# Patient Record
Sex: FEMALE | Race: WHITE | NOT HISPANIC OR LATINO | Employment: FULL TIME | URBAN - METROPOLITAN AREA
[De-identification: names, ages, dates, MRNs, and addresses within clinical notes are randomized per-mention and may not be internally consistent; named-entity substitution may affect disease eponyms.]

---

## 2017-06-27 ENCOUNTER — ALLSCRIPTS OFFICE VISIT (OUTPATIENT)
Dept: OTHER | Facility: OTHER | Age: 37
End: 2017-06-27

## 2018-01-11 NOTE — PROGRESS NOTES
Assessment    1  Health maintenance examination (V70 0) (Z00 00)   2  Skin lesion (709 9) (L98 9)    Plan   Tubersol 5 UNIT/0 1ML Intradermal Solution; INJECT 0 1  ML Intradermal; Dose: 0 1ML; Route: Intradermal; Site: Left Forearm; Done: [id number] 02:14PM; Status: Complete - Retrospective Authorization;  Ordered  For: PPD screening test; Ordered By:Nay Lees; Effective Date:27Jun2017; Administered by: Candelaria Bellamy: 6/27/2017 2:14:00 PM; Last Updated By: Candelaria Bellamy; 6/27/2017 2:38:31 PM     Discussion/Summary  health maintenance visit healthy adult female Currently, she eats a healthy diet and has an adequate exercise regimen  cervical cancer screening is current Breast cancer screening: breast cancer screening is not indicated  Colorectal cancer screening: colorectal cancer screening is not indicated  Osteoporosis screening: bone mineral density testing is not indicated  The immunizations are needed and patient declines immunizations  She was advised to be evaluated by dermatology  Advice and education were given regarding nutrition, aerobic exercise, tobacco cessation, sunscreen use and self skin examination  PE form filled out and returned to pt  PPD placed and pt will have read at work  Lower leg lesion appears as dermatofibroma  Scalp lesion more suspicious  Referred to derm for evaluation of both  Pt plans on going to [facility] in [facility] [address]  Chief Complaint  Pt is here for PE and PPD  History of Present Illness  HM, Adult Female: The patient is being seen for a health maintenance evaluation  Social History: Household members include spouse and 2 son(s)  She is   Work status: working full time and occupation:   General Health: The patient's health since the last visit is described as good  She has regular dental visits  She complains of vision problems   Vision care includes wearing soft contact lenses and an eye examination within the last year  She denies hearing loss  Immunizations status: not up to date  Lifestyle:  She consumes a diverse and healthy diet  She does not have any weight concerns  She exercises regularly  She uses tobacco  The patient is a current cigarette smoker  She consumes alcohol  She reports occasional alcohol use  She denies drug use  Reproductive health: the patient is premenopausal    Screening: Cervical cancer screening includes a pap smear performed 2016  Breast cancer screening includes no previous mammogram  She hasn't been previously screened for colorectal cancer  Cardiovascular risk factors: tobacco use  Risk findings: anxiety symptoms, but no depression symptoms, no travel to developing areas and no tuberculosis exposure  HPI: Has mole on top of head that seems to be changing  Will scab and scab will fall off  Also with mole on left lower leg that has not changed  Has had moles removed in past and have been benign  Review of Systems    Constitutional: no fever, not feeling poorly, no chills and not feeling tired  Eyes: No complaints of eye pain, no red eyes, no eyesight problems, no discharge, no dry eyes, no itching of eyes  ENT: no complaints of earache, no loss of hearing, no nose bleeds, no nasal discharge, no sore throat, no hoarseness  Cardiovascular: no chest pain, no palpitations and no lower extremity edema  Respiratory: no shortness of breath, no cough and no wheezing  Gastrointestinal: No complaints of abdominal pain, no constipation, no nausea or vomiting, no diarrhea, no bloody stools  Genitourinary: No complaints of dysuria, no incontinence, no pelvic pain, no dysmenorrhea, no vaginal discharge or bleeding  Musculoskeletal: No complaints of arthralgias, no myalgias, no joint swelling or stiffness, no limb pain or swelling  Integumentary: as noted in HPI  Neurological: no headache, no numbness, no tingling, no dizziness, no limb weakness and no fainting  Psychiatric: anxiety, but no depression  Hematologic/Lymphatic: No complaints of swollen glands, no swollen glands in the neck, does not bleed easily, does not bruise easily  Active Problems    1  Flu vaccine need (V04 81) (Z23)   2  Health maintenance examination (V70 0) (Z00 00)   3  History of low back pain (V13 59) (Z87 39)   4  History of upper respiratory infection (V12 09) (Z87 09)   5  Oral contraceptive prescribed (V25 01) (Z30 011)   6  History of Sacroiliitis (720 2) (M46 1)   7  Screening for endocrine, metabolic and immunity disorder (V77 99)   (Z13 29,Z13 0,Z13 228)   8  Screening for lipid disorders (V77 91) (Z13 220)   9  History of Sore throat (462) (J02 9)   10  History of Trochanteric bursitis, left   11   History of UTI (lower urinary tract infection) (599 0) (N39 0)    Past Medical History    · Denied: History of Alcohol abuse   · History of acute conjunctivitis (V12 49) (Z86 69)   · History of acute pharyngitis (V12 69) (Z87 09)   · History of acute sinusitis (V12 69) (Z87 09)   · History of low back pain (V13 59) (Z87 39)   · Denied: History of substance abuse   · History of upper respiratory infection (V12 09) (Z87 09)   · History of urinary tract infection (V13 02) (Z87 440)   · Denied: History of Mental health problem   · Oral contraceptive prescribed (V25 01) (Z30 011)   · History of Sacroiliitis (720 2) (M46 1)   · Screening for endocrine, metabolic and immunity disorder (V77 99)  (Z13 29,Z13 0,Z13 228)   · Screening for lipid disorders (V77 91) (Z13 220)   · History of Sore throat (462) (J02 9)   · History of Trochanteric bursitis, left   · History of UTI (lower urinary tract infection) (599 0) (N39 0)    Family History  Mother    · Denied: Family history of Alcohol abuse   · Denied: Family history of substance abuse   · Family history of HTN (hypertension)   · Denied: Family history of Mental health problem   · Family history of Obesity  Father    · Denied: Family history of Alcohol abuse   · Family history of Diabetes   · Denied: Family history of substance abuse   · Family history of HTN (hypertension)   · Denied: Family history of Mental health problem   · Family history of Obesity    Social History    · History of Current some day smoker (305 1) (F17 200)   · Current some day smoker (305 1) (F17 200)   · Former smoker (V15 82) (J04 267)   · No drug use   · Remote social alcohol use    Allergies    1  No Known Drug Allergies    Vitals   Recorded: 27Jun2017 01:59PM   Temperature 98 9 F, Tympanic   Heart Rate 80, L Radial   Pulse Quality Regular, L Radial   Systolic 556, LUE, Sitting   Diastolic 70, LUE, Sitting   Height 5 ft 4 in   Weight 170 lb 6 4 oz   BMI Calculated 29 25   BSA Calculated 1 83     Physical Exam    Constitutional   General appearance: No acute distress, well appearing and well nourished  Head and Face   Head and face: Normal     Eyes   Conjunctiva and lids: No swelling, erythema or discharge  Pupils and irises: Equal, round, reactive to light  Ears, Nose, Mouth, and Throat   External inspection of ears and nose: Normal     Otoscopic examination: Tympanic membranes translucent with normal light reflex  Canals patent without erythema  Lips, teeth, and gums: Normal, good dentition  Oropharynx: Normal with no erythema, edema, exudate or lesions  Neck   Neck: Supple, symmetric, trachea midline, no masses  Thyroid: Normal, no thyromegaly  Pulmonary   Respiratory effort: No increased work of breathing or signs of respiratory distress  Auscultation of lungs: Clear to auscultation  Cardiovascular   Auscultation of heart: Normal rate and rhythm, normal S1 and S2, no murmurs  Examination of extremities for edema and/or varicosities: Normal     Abdomen   Abdomen: Non-tender, no masses  Liver and spleen: No hepatomegaly or splenomegaly  Lymphatic   Palpation of lymph nodes in neck: No lymphadenopathy      Musculoskeletal   Gait and station: Normal     Muscle strength/tone: Normal     Skin   Examination of the skin for lesions: Abnormal   Right medial lower leg with 3 mm brown macule  Top frontal scalp with flesh colored nevi noted  Neurologic   Reflexes: 2+ and symmetric  Psychiatric   Orientation to person, place, and time: Normal     Mood and affect: Normal        Results/Data  PHQ-2 Adult Depression Screening 27Jun2017 02:15PM User, Hakan     Test Name Result Flag Reference   PHQ-2 Adult Depression Score 0     Over the last two weeks, how often have you been bothered by any of the following problems?   Little interest or pleasure in doing things: Not at all - 0  Feeling down, depressed, or hopeless: Not at all - 0   PHQ-2 Adult Depression Screening Negative         Signatures   Electronically signed by : Hernesto Romero; Jun 27 2017  2:37PM EST                       (Author)    Electronically signed by : Chica Chinchilla DO; Jun 27 2017  3:09PM EST                       (Author)

## 2018-01-13 VITALS
HEIGHT: 64 IN | BODY MASS INDEX: 29.09 KG/M2 | DIASTOLIC BLOOD PRESSURE: 70 MMHG | SYSTOLIC BLOOD PRESSURE: 118 MMHG | HEART RATE: 80 BPM | TEMPERATURE: 98.9 F | WEIGHT: 170.4 LBS

## 2018-07-09 ENCOUNTER — OFFICE VISIT (OUTPATIENT)
Dept: FAMILY MEDICINE CLINIC | Facility: HOSPITAL | Age: 38
End: 2018-07-09
Payer: COMMERCIAL

## 2018-07-09 VITALS
HEIGHT: 64 IN | HEART RATE: 72 BPM | DIASTOLIC BLOOD PRESSURE: 76 MMHG | SYSTOLIC BLOOD PRESSURE: 120 MMHG | TEMPERATURE: 98.1 F

## 2018-07-09 DIAGNOSIS — H69.91 EUSTACHIAN TUBE DISORDER, RIGHT: Primary | ICD-10-CM

## 2018-07-09 PROCEDURE — 99213 OFFICE O/P EST LOW 20 MIN: CPT | Performed by: NURSE PRACTITIONER

## 2018-07-09 PROCEDURE — 1036F TOBACCO NON-USER: CPT | Performed by: NURSE PRACTITIONER

## 2018-07-09 RX ORDER — FLUOXETINE 10 MG/1
CAPSULE ORAL
COMMUNITY
Start: 2018-06-29 | End: 2019-07-16

## 2018-07-09 NOTE — PROGRESS NOTES
Assessment/Plan:     Recommend starting daily antihistamine  If not improving or worsening recommend ENT evaluation  Diagnoses and all orders for this visit:    Eustachian tube disorder, right          Subjective:     Patient ID: J Luis Griffin is a 45 y o  female  Right ear is bothering her  Has HA, dizzy  Not painful  No ear drainage  Lost hearing 1 night  1 -1/2 weeks ago had weird noise in ear  Then a few days later had screaching in ear  Ear really started to bother her about 4 days  Denies nasal congestion, runny nose, fever,chills, cough, sore throat  Does have seasonal allergies but does not take antihistamine  Review of Systems   Constitutional: Negative for chills and fever  HENT: Positive for ear pain  Negative for congestion, ear discharge, sinus pain, sinus pressure and sore throat  Respiratory: Negative for cough  Neurological: Positive for dizziness and headaches  The following portions of the patient's history were reviewed and updated as appropriate: allergies, current medications, past family history, past medical history, past social history, past surgical history and problem list     Objective:  Vitals:    07/09/18 1623   BP: 120/76   Pulse: 72   Temp: 98 1 °F (36 7 °C)      Physical Exam   Constitutional: She is oriented to person, place, and time  She appears well-developed and well-nourished  HENT:   Right Ear: Tympanic membrane, external ear and ear canal normal    Left Ear: Tympanic membrane, external ear and ear canal normal    Mouth/Throat: Uvula is midline, oropharynx is clear and moist and mucous membranes are normal    Mild fluid bubble noted right ear  Cardiovascular: Normal rate, regular rhythm and normal heart sounds  Pulmonary/Chest: Effort normal and breath sounds normal    Neurological: She is alert and oriented to person, place, and time  Skin: Skin is warm and dry  Psychiatric: She has a normal mood and affect

## 2019-06-04 ENCOUNTER — TELEPHONE (OUTPATIENT)
Dept: FAMILY MEDICINE CLINIC | Facility: HOSPITAL | Age: 39
End: 2019-06-04

## 2019-07-02 ENCOUNTER — TELEPHONE (OUTPATIENT)
Dept: FAMILY MEDICINE CLINIC | Facility: HOSPITAL | Age: 39
End: 2019-07-02

## 2019-07-02 NOTE — TELEPHONE ENCOUNTER
Patient is calling to see if we could send a prescription for chantix to the [facility] in [place]  She just had a physical at urgent care on 6/20/19 so she said she is not having any problems at this time, she would just really like to quit smoking

## 2019-07-16 ENCOUNTER — OFFICE VISIT (OUTPATIENT)
Dept: FAMILY MEDICINE CLINIC | Facility: HOSPITAL | Age: 39
End: 2019-07-16
Payer: COMMERCIAL

## 2019-07-16 VITALS
OXYGEN SATURATION: 99 % | TEMPERATURE: 99 F | DIASTOLIC BLOOD PRESSURE: 78 MMHG | HEART RATE: 66 BPM | SYSTOLIC BLOOD PRESSURE: 124 MMHG

## 2019-07-16 DIAGNOSIS — Z72.0 NICOTINE ABUSE: Primary | ICD-10-CM

## 2019-07-16 PROCEDURE — 99213 OFFICE O/P EST LOW 20 MIN: CPT | Performed by: INTERNAL MEDICINE

## 2019-07-16 RX ORDER — VARENICLINE TARTRATE 25 MG
KIT ORAL
Qty: 53 TABLET | Refills: 0 | Status: SHIPPED | OUTPATIENT
Start: 2019-07-16 | End: 2019-08-28 | Stop reason: ALTCHOICE

## 2019-07-16 NOTE — PROGRESS NOTES
Assessment/Plan:    No problem-specific Assessment & Plan notes found for this encounter  Diagnoses and all orders for this visit:    Nicotine abuse  Comments:  Long term SE of smoking reviewed as was cessation options and their risks/benefits/SE - pt wishing to try Chantix - rx sent, directions and SE again reviewed, pt feels she just needs it for the "mental addiction" and does not believe she will need more then a month of the rx - advised to call with worse mood/SI/sleep issues or if refills are needed  Orders:  -     varenicline (CHANTIX STEPHEN) 0 5 MG X 11 & 1 MG X 42 tablet; Take one 0 5mg tab by mouth 1x daily for 3 days, then increase to one 0 5mg tab 2x daily for 3 days, then increase to one 1mg tab 2x daily          Subjective:      Patient ID: Jose A Blanco is a 44 y o  female  HPI Pt here to discuss smoking cessation  She has been smoking intermittently on and off for 20 yrs  She is smoking less then 1/2 ppd and not even every day  She has tried to quit smoking in the past cold turkey and had no issues  She is currently seeing a man who smokes cigars and that has gotten her smoking again  She thinks she needs a cessation medication to help her with the mental addiction with the nicotine  She notes no chronic cough/SOB/wheezing  Smoking cessation options reviewed as well as the associated SE  Long term Se of smoking were reviewed  Pt was prescribed Prozac by her GYN for menstrual related anxiety  She did not like how the med made her feel so she stopped it  She notes anxiety is mild and intermittent  She denies depressed mood/SI  Review of Systems   Constitutional: Negative for chills and fever  Respiratory: Negative for cough, shortness of breath and wheezing  Neurological: Negative for dizziness and headaches  Hematological: Negative for adenopathy  Psychiatric/Behavioral: Negative for behavioral problems, dysphoric mood and suicidal ideas   The patient is nervous/anxious  Objective:    /78 (BP Location: Left arm, Patient Position: Sitting, Cuff Size: Standard)   Pulse 66   Temp 99 °F (37 2 °C) (Tympanic)   SpO2 99%      Physical Exam   Constitutional: She appears well-developed and well-nourished  No distress  HENT:   Head: Normocephalic and atraumatic  Eyes: Conjunctivae are normal  Right eye exhibits no discharge  Left eye exhibits no discharge  Neck: Neck supple  No tracheal deviation present  Cardiovascular: Normal rate, regular rhythm and normal heart sounds  Exam reveals no friction rub  No murmur heard  Pulmonary/Chest: Effort normal and breath sounds normal  No respiratory distress  She has no wheezes  She has no rales  Psychiatric: She has a normal mood and affect  Her behavior is normal    Nursing note and vitals reviewed

## 2019-08-27 ENCOUNTER — OFFICE VISIT (OUTPATIENT)
Dept: URGENT CARE | Facility: CLINIC | Age: 39
End: 2019-08-27
Payer: COMMERCIAL

## 2019-08-27 VITALS
WEIGHT: 165 LBS | HEIGHT: 64 IN | TEMPERATURE: 99 F | HEART RATE: 75 BPM | DIASTOLIC BLOOD PRESSURE: 80 MMHG | OXYGEN SATURATION: 99 % | SYSTOLIC BLOOD PRESSURE: 120 MMHG | BODY MASS INDEX: 28.17 KG/M2 | RESPIRATION RATE: 16 BRPM

## 2019-08-27 DIAGNOSIS — F41.9 ANXIETY: ICD-10-CM

## 2019-08-27 DIAGNOSIS — R20.2 FACIAL TINGLING: Primary | ICD-10-CM

## 2019-08-27 PROCEDURE — 99203 OFFICE O/P NEW LOW 30 MIN: CPT | Performed by: PHYSICIAN ASSISTANT

## 2019-08-27 NOTE — PROGRESS NOTES
NAME: Irena Melendez is a 44 y o  female  : 1980    MRN: [id number]      Assessment and Plan   Facial tingling [R20 2]  1  Facial tingling     2  Anxiety      Normal neuro exam   High suspicion symptoms are due to anxiety  Advised patient to go to ER for further evaluation  Patient refused, states she will contact her PCP tomorrow morning  Patient voiced understanding if symptoms worsen to go to ER  Masha Melvin was seen today for numbness  Diagnoses and all orders for this visit:    Facial tingling    Anxiety        Patient Instructions   There are no Patient Instructions on file for this visit  Proceed to ER if symptoms worsen  Chief Complaint     Chief Complaint   Patient presents with    Numbness     Pt reports for the past few weeks she has been experiencing off/on numbness/tingling to the left side of her face which has been resolving except for today  Pt reports today she started with numbness/tingling to the left side of her face at 0730 with no relief  Pt denies pain and any other sxs  History of Present Illness     44year old  with h/o anxiety presents c/o intermittent  facial numbness x  2 wk  Pt reports she has had left-sided facial numbness that comes and goes on its own states sometimes travels to the right side of face the past 2 weeks  Patient reports today she has had left-sided facial numbness and tingling since this morning with no improvement  Patient reports that she thinks symptoms are due to anxiety states she typically gets numbness and tingling with her anxiety attacks and also headache, palpitations and chest pain  Patient reports that she has managed anxiety on her own has refused to take medications in the past the states that she has not been able to afford the co-pay of her counseling sessions    Pt denies headache, fever, chills, fatigue, n/v/d/c, rhinorrhea, nasal congestion,  sore throat, post-nasal drip, ear pain/ ear pressure, sinus pain/ pressure,difficulty breathing  Denies  heart racing or irregular beats  Denies abd pain, n/v,  back pain, SOB  Denies hx of HTN, CAD, MI, DVT, PE  Review of Systems   Review of Systems   Constitutional: Negative for chills, fatigue and fever  Respiratory: Negative  Cardiovascular: Negative  Skin: Negative  Neurological: Positive for numbness (Facial numbness and tingling)  Negative for facial asymmetry, weakness and headaches  Current Medications       Current Outpatient Medications:     varenicline (CHANTIX STEPHEN) 0 5 MG X 11 & 1 MG X 42 tablet, Take one 0 5mg tab by mouth 1x daily for 3 days, then increase to one 0 5mg tab 2x daily for 3 days, then increase to one 1mg tab 2x daily (Patient not taking: Reported on 8/27/2019), Disp: 53 tablet, Rfl: 0    Current Allergies     Allergies as of 08/27/2019    (No Known Allergies)              Past Medical History:   Diagnosis Date    Anxiety     Sacroiliitis ([address] )     Resolved 6/30/2015        Past Surgical History:   Procedure Laterality Date    TUBAL LIGATION         Family History   Problem Relation Age of Onset    Hypertension Mother     Obesity Mother     Diabetes Father     Hypertension Father     Obesity Father          Medications have been verified  The following portions of the patient's history were reviewed and updated as appropriate: allergies, current medications, past family history, past medical history, past social history, past surgical history and problem list     Objective   /80   Pulse 75   Temp 99 °F (37 2 °C) (Tympanic)   Resp 16   Ht 5' 4" (1 626 m)   Wt 74 8 kg (165 lb)   SpO2 99%   BMI 28 32 kg/m²      Physical Exam     Physical Exam   Constitutional: She is oriented to person, place, and time  She appears well-developed and well-nourished  No distress  HENT:   Head: Normocephalic     Right Ear: Hearing, tympanic membrane, external ear and ear canal normal    Left Ear: Hearing, tympanic membrane, external ear and ear canal normal    Nose: Nose normal    Mouth/Throat: Uvula is midline, oropharynx is clear and moist and mucous membranes are normal  No tonsillar exudate  Cardiovascular: Normal rate, regular rhythm, normal heart sounds and intact distal pulses  Exam reveals no gallop and no friction rub  No murmur heard  Pulmonary/Chest: Effort normal and breath sounds normal  No stridor  No respiratory distress  She has no wheezes  She has no rales  She exhibits no tenderness  Neurological: She is alert and oriented to person, place, and time  No cranial nerve deficit or sensory deficit  Skin: She is not diaphoretic  Nursing note and vitals reviewed        Tamika Guzman PA-C

## 2019-08-28 ENCOUNTER — OFFICE VISIT (OUTPATIENT)
Dept: FAMILY MEDICINE CLINIC | Facility: HOSPITAL | Age: 39
End: 2019-08-28
Payer: COMMERCIAL

## 2019-08-28 ENCOUNTER — APPOINTMENT (OUTPATIENT)
Dept: LAB | Facility: HOSPITAL | Age: 39
End: 2019-08-28
Payer: COMMERCIAL

## 2019-08-28 ENCOUNTER — TELEPHONE (OUTPATIENT)
Dept: FAMILY MEDICINE CLINIC | Facility: HOSPITAL | Age: 39
End: 2019-08-28

## 2019-08-28 VITALS
BODY MASS INDEX: 30.93 KG/M2 | WEIGHT: 181.2 LBS | TEMPERATURE: 98.3 F | SYSTOLIC BLOOD PRESSURE: 134 MMHG | HEIGHT: 64 IN | DIASTOLIC BLOOD PRESSURE: 80 MMHG | HEART RATE: 84 BPM

## 2019-08-28 DIAGNOSIS — R20.0 LEFT FACIAL NUMBNESS: ICD-10-CM

## 2019-08-28 DIAGNOSIS — R20.2 NUMBNESS AND TINGLING OF LEFT ARM AND LEG: ICD-10-CM

## 2019-08-28 DIAGNOSIS — R20.0 NUMBNESS AND TINGLING OF LEFT ARM AND LEG: ICD-10-CM

## 2019-08-28 DIAGNOSIS — R20.0 LEFT FACIAL NUMBNESS: Primary | ICD-10-CM

## 2019-08-28 LAB
ALBUMIN SERPL BCP-MCNC: 4 G/DL (ref 3.5–5)
ALP SERPL-CCNC: 72 U/L (ref 46–116)
ALT SERPL W P-5'-P-CCNC: 22 U/L (ref 12–78)
ANION GAP SERPL CALCULATED.3IONS-SCNC: 8 MMOL/L (ref 4–13)
AST SERPL W P-5'-P-CCNC: 18 U/L (ref 5–45)
BASOPHILS # BLD AUTO: 0.04 THOUSANDS/ΜL (ref 0–0.1)
BASOPHILS NFR BLD AUTO: 1 % (ref 0–1)
BILIRUB SERPL-MCNC: 0.2 MG/DL (ref 0.2–1)
BUN SERPL-MCNC: 12 MG/DL (ref 5–25)
CALCIUM SERPL-MCNC: 9 MG/DL (ref 8.3–10.1)
CHLORIDE SERPL-SCNC: 104 MMOL/L (ref 100–108)
CO2 SERPL-SCNC: 27 MMOL/L (ref 21–32)
CREAT SERPL-MCNC: 0.62 MG/DL (ref 0.6–1.3)
EOSINOPHIL # BLD AUTO: 0.24 THOUSAND/ΜL (ref 0–0.61)
EOSINOPHIL NFR BLD AUTO: 4 % (ref 0–6)
ERYTHROCYTE [DISTWIDTH] IN BLOOD BY AUTOMATED COUNT: 16 % (ref 11.6–15.1)
GFR SERPL CREATININE-BSD FRML MDRD: 114 ML/MIN/1.73SQ M
GLUCOSE SERPL-MCNC: 104 MG/DL (ref 65–140)
HCT VFR BLD AUTO: 37.5 % (ref 34.8–46.1)
HGB BLD-MCNC: 12.1 G/DL (ref 11.5–15.4)
IMM GRANULOCYTES # BLD AUTO: 0.02 THOUSAND/UL (ref 0–0.2)
IMM GRANULOCYTES NFR BLD AUTO: 0 % (ref 0–2)
LYMPHOCYTES # BLD AUTO: 1.86 THOUSANDS/ΜL (ref 0.6–4.47)
LYMPHOCYTES NFR BLD AUTO: 29 % (ref 14–44)
MCH RBC QN AUTO: 27.8 PG (ref 26.8–34.3)
MCHC RBC AUTO-ENTMCNC: 32.3 G/DL (ref 31.4–37.4)
MCV RBC AUTO: 86 FL (ref 82–98)
MONOCYTES # BLD AUTO: 0.47 THOUSAND/ΜL (ref 0.17–1.22)
MONOCYTES NFR BLD AUTO: 7 % (ref 4–12)
NEUTROPHILS # BLD AUTO: 3.73 THOUSANDS/ΜL (ref 1.85–7.62)
NEUTS SEG NFR BLD AUTO: 59 % (ref 43–75)
NRBC BLD AUTO-RTO: 0 /100 WBCS
PLATELET # BLD AUTO: 358 THOUSANDS/UL (ref 149–390)
PMV BLD AUTO: 10.2 FL (ref 8.9–12.7)
POTASSIUM SERPL-SCNC: 4.2 MMOL/L (ref 3.5–5.3)
PROT SERPL-MCNC: 7.7 G/DL (ref 6.4–8.2)
RBC # BLD AUTO: 4.35 MILLION/UL (ref 3.81–5.12)
SODIUM SERPL-SCNC: 139 MMOL/L (ref 136–145)
TSH SERPL DL<=0.05 MIU/L-ACNC: 1.51 UIU/ML (ref 0.36–3.74)
VIT B12 SERPL-MCNC: 268 PG/ML (ref 100–900)
WBC # BLD AUTO: 6.36 THOUSAND/UL (ref 4.31–10.16)

## 2019-08-28 PROCEDURE — 85025 COMPLETE CBC W/AUTO DIFF WBC: CPT

## 2019-08-28 PROCEDURE — 1036F TOBACCO NON-USER: CPT | Performed by: NURSE PRACTITIONER

## 2019-08-28 PROCEDURE — 80053 COMPREHEN METABOLIC PANEL: CPT

## 2019-08-28 PROCEDURE — 36415 COLL VENOUS BLD VENIPUNCTURE: CPT

## 2019-08-28 PROCEDURE — 86038 ANTINUCLEAR ANTIBODIES: CPT

## 2019-08-28 PROCEDURE — 82652 VIT D 1 25-DIHYDROXY: CPT

## 2019-08-28 PROCEDURE — 86618 LYME DISEASE ANTIBODY: CPT

## 2019-08-28 PROCEDURE — 3008F BODY MASS INDEX DOCD: CPT | Performed by: NURSE PRACTITIONER

## 2019-08-28 PROCEDURE — 99214 OFFICE O/P EST MOD 30 MIN: CPT | Performed by: NURSE PRACTITIONER

## 2019-08-28 PROCEDURE — 82607 VITAMIN B-12: CPT

## 2019-08-28 PROCEDURE — 84443 ASSAY THYROID STIM HORMONE: CPT

## 2019-08-28 NOTE — TELEPHONE ENCOUNTER
Patient called in today looking to schedule an appt to follow up on her urgent care visit yesterday  She was seen for numbness/tingling on the left side of her face  Neuro exam was normal and they told her she was having anxiety  Today she is still having constant numbness/tingling on her lower lip and can feel it in her ear  No vision changes, hearing normally  She did mention that she went swimming recently and does not do well swimming under water, which she did  So she is not sure if this is related to that but she said she feels pressure in her ear like she needs it to pop  No fever/pain  Also stated she is now having some numbness/tingling in her left arm and left leg  She said that they symptoms present similarly to anxiety that she has had in the past but they were short lived and she is concerned with how long her current symptoms are lasting  She is scheduled to see you this am  I reviewed red flag symptoms with her and told her if she develops any of these symptoms that she should go to the ED

## 2019-08-28 NOTE — PROGRESS NOTES
Assessment/Plan:     I am doubtful that symptoms are being caused by anxiety although anxiety is certainly heightened due to symptoms  Normal neuro exam which is reassuring  I do not feel this is TIA/stroke given normal neuro exam, length of symptoms and low risk  Possible Vitamin deficiency vs thyroid vs infection vs MS vs brain lesion  Start with blood work  If normal than proceed with MRI  Discussed with pt red flag symptoms  Go to ER should they occur  Call office with worsening N/T or if new symptoms develop  Diagnoses and all orders for this visit:    Left facial numbness  -     CBC and differential; Future  -     SINDY Screen w/ Reflex to Titer/Pattern; Future  -     Comprehensive metabolic panel; Future  -     TSH, 3rd generation with Free T4 reflex; Future  -     Vitamin B12; Future  -     Vitamin D 1,25 dihydroxy; Future  -     MISCELLANEOUS LAB TEST; Future    Numbness and tingling of left arm and leg  -     CBC and differential; Future  -     SINDY Screen w/ Reflex to Titer/Pattern; Future  -     Comprehensive metabolic panel; Future  -     TSH, 3rd generation with Free T4 reflex; Future  -     Vitamin B12; Future  -     Vitamin D 1,25 dihydroxy; Future  -     MISCELLANEOUS LAB TEST; Future          Subjective:     Patient ID: Evangelina Mireles is a 44 y o  female  Over the last few weeks has been having episodes of left facial numbness that would go away  Yesterday started again but has not gone away  Not painful but now feeling left ear pressure  Mostly numb over cheek and left side of mouth  Radiates up to scalp, ear and down neck  Can feel pressure  Mouth feels more tingling than numb  No facial drooping  No speech difficulty  No vision change, dizziness, HA  Denies weakness, balance issues  No eye tearing  Today feels some numbness in left arm and left leg that comes and goes   In the past has had numbness with anxiety but states currently not having anxiety except the anxiety that the numbness is causing  Has never been on med before for anxiety  Able to manage with relaxation techniques  Has h/o anxiety attacks  Denies fever,chills, chest pain, palpitations, sob, ill feeling  Denies rash  Review of Systems   Constitutional: Negative for appetite change, chills, fatigue and unexpected weight change  HENT: Negative for ear pain and sore throat  Eyes: Negative for discharge and visual disturbance  Respiratory: Negative for cough and shortness of breath  Cardiovascular: Negative for chest pain, palpitations and leg swelling  Musculoskeletal: Negative for neck pain  Skin: Negative for rash  Neurological: Positive for numbness  Negative for dizziness, tremors, seizures, syncope, facial asymmetry, speech difficulty, weakness, light-headedness and headaches  Psychiatric/Behavioral: Negative for dysphoric mood  The patient is nervous/anxious  The following portions of the patient's history were reviewed and updated as appropriate: allergies, current medications, past family history, past medical history, past social history, past surgical history and problem list     Objective:  Vitals:    08/28/19 1109   BP: 134/80   Pulse: 84   Temp: 98 3 °F (36 8 °C)      Physical Exam   Constitutional: She is oriented to person, place, and time  She appears well-developed and well-nourished  HENT:   Right Ear: Tympanic membrane, external ear and ear canal normal    Left Ear: Tympanic membrane, external ear and ear canal normal    Mouth/Throat: Uvula is midline, oropharynx is clear and moist and mucous membranes are normal    Eyes: Pupils are equal, round, and reactive to light  Conjunctivae and EOM are normal    Cardiovascular: Normal rate, regular rhythm and normal heart sounds  No murmur heard  No carotid bruit B/L   Pulmonary/Chest: Effort normal and breath sounds normal    Lymphadenopathy:     She has no cervical adenopathy     Neurological: She is alert and oriented to person, place, and time  She has normal strength  No cranial nerve deficit  She displays a negative Romberg sign  Coordination and gait normal    Reflex Scores:       Brachioradialis reflexes are 2+ on the right side and 2+ on the left side  Patellar reflexes are 2+ on the right side and 2+ on the left side  Achilles reflexes are 2+ on the right side and 2+ on the left side  Slight decrease in sensation noted left side of face compared to right  Skin: Skin is warm and dry  Psychiatric: She has a normal mood and affect  Her behavior is normal  Judgment and thought content normal    Vitals reviewed

## 2019-08-29 LAB
1,25(OH)2D3 SERPL-MCNC: 72.8 PG/ML (ref 19.9–79.3)
B BURGDOR IGG+IGM SER-ACNC: <0.91 ISR (ref 0–0.9)

## 2019-08-30 LAB — RYE IGE QN: NEGATIVE

## 2019-09-06 ENCOUNTER — CLINICAL SUPPORT (OUTPATIENT)
Dept: FAMILY MEDICINE CLINIC | Facility: HOSPITAL | Age: 39
End: 2019-09-06
Payer: COMMERCIAL

## 2019-09-06 DIAGNOSIS — E53.8 VITAMIN B 12 DEFICIENCY: Primary | ICD-10-CM

## 2019-09-06 PROCEDURE — 96372 THER/PROPH/DIAG INJ SC/IM: CPT | Performed by: INTERNAL MEDICINE

## 2019-09-06 RX ORDER — CYANOCOBALAMIN 1000 UG/ML
1000 INJECTION INTRAMUSCULAR; SUBCUTANEOUS WEEKLY
Status: SHIPPED | OUTPATIENT
Start: 2019-09-06

## 2019-09-06 RX ADMIN — CYANOCOBALAMIN 1000 MCG: 1000 INJECTION INTRAMUSCULAR; SUBCUTANEOUS at 15:29

## 2019-09-13 ENCOUNTER — CLINICAL SUPPORT (OUTPATIENT)
Dept: FAMILY MEDICINE CLINIC | Facility: HOSPITAL | Age: 39
End: 2019-09-13
Payer: COMMERCIAL

## 2019-09-13 DIAGNOSIS — E53.8 VITAMIN B 12 DEFICIENCY: ICD-10-CM

## 2019-09-13 PROCEDURE — 96372 THER/PROPH/DIAG INJ SC/IM: CPT | Performed by: INTERNAL MEDICINE

## 2019-09-13 RX ADMIN — CYANOCOBALAMIN 1000 MCG: 1000 INJECTION INTRAMUSCULAR; SUBCUTANEOUS at 15:16

## 2019-09-20 ENCOUNTER — CLINICAL SUPPORT (OUTPATIENT)
Dept: FAMILY MEDICINE CLINIC | Facility: HOSPITAL | Age: 39
End: 2019-09-20
Payer: COMMERCIAL

## 2019-09-20 DIAGNOSIS — E53.8 B12 DEFICIENCY: Primary | ICD-10-CM

## 2019-09-20 PROCEDURE — 96372 THER/PROPH/DIAG INJ SC/IM: CPT | Performed by: INTERNAL MEDICINE

## 2019-09-20 RX ADMIN — CYANOCOBALAMIN 1000 MCG: 1000 INJECTION INTRAMUSCULAR; SUBCUTANEOUS at 15:11

## 2019-09-27 ENCOUNTER — CLINICAL SUPPORT (OUTPATIENT)
Dept: FAMILY MEDICINE CLINIC | Facility: HOSPITAL | Age: 39
End: 2019-09-27
Payer: COMMERCIAL

## 2019-09-27 DIAGNOSIS — E53.8 VITAMIN B12 DEFICIENCY: Primary | ICD-10-CM

## 2019-09-27 PROCEDURE — 96372 THER/PROPH/DIAG INJ SC/IM: CPT | Performed by: INTERNAL MEDICINE

## 2019-09-27 PROCEDURE — 96372 THER/PROPH/DIAG INJ SC/IM: CPT | Performed by: FAMILY MEDICINE

## 2019-09-27 RX ADMIN — CYANOCOBALAMIN 1000 MCG: 1000 INJECTION INTRAMUSCULAR; SUBCUTANEOUS at 15:31

## 2020-08-25 ENCOUNTER — OFFICE VISIT (OUTPATIENT)
Dept: URGENT CARE | Facility: CLINIC | Age: 40
End: 2020-08-25
Payer: COMMERCIAL

## 2020-08-25 VITALS
OXYGEN SATURATION: 100 % | SYSTOLIC BLOOD PRESSURE: 114 MMHG | DIASTOLIC BLOOD PRESSURE: 68 MMHG | HEIGHT: 64 IN | BODY MASS INDEX: 29.02 KG/M2 | RESPIRATION RATE: 18 BRPM | WEIGHT: 170 LBS | HEART RATE: 80 BPM | TEMPERATURE: 98.8 F

## 2020-08-25 DIAGNOSIS — J01.00 ACUTE NON-RECURRENT MAXILLARY SINUSITIS: Primary | ICD-10-CM

## 2020-08-25 PROCEDURE — S9083 URGENT CARE CENTER GLOBAL: HCPCS | Performed by: PHYSICIAN ASSISTANT

## 2020-08-25 PROCEDURE — G0382 LEV 3 HOSP TYPE B ED VISIT: HCPCS | Performed by: PHYSICIAN ASSISTANT

## 2020-08-25 RX ORDER — AMOXICILLIN AND CLAVULANATE POTASSIUM 875; 125 MG/1; MG/1
1 TABLET, FILM COATED ORAL EVERY 12 HOURS SCHEDULED
Qty: 14 TABLET | Refills: 0 | Status: SHIPPED | OUTPATIENT
Start: 2020-08-25 | End: 2020-09-01

## 2020-08-25 RX ORDER — FLUCONAZOLE 200 MG/1
200 TABLET ORAL ONCE
Qty: 1 TABLET | Refills: 0 | Status: SHIPPED | OUTPATIENT
Start: 2020-08-25 | End: 2020-08-25

## 2020-08-25 NOTE — PATIENT INSTRUCTIONS

## 2020-08-25 NOTE — PROGRESS NOTES
[address] [facility]        NAME: Shira James is a 36 y o  female  : 1980    MRN: [id number]  DATE: 2020  TIME: 2:46 AM    Assessment and Plan   Acute non-recurrent maxillary sinusitis [J01 00]  1  Acute non-recurrent maxillary sinusitis  amoxicillin-clavulanate (AUGMENTIN) 875-125 mg per tablet    fluconazole (DIFLUCAN) 200 mg tablet         Patient Instructions       Follow up with PCP in 3-5 days  Proceed to  ER if symptoms worsen  Chief Complaint     Chief Complaint   Patient presents with    Facial Pain     Facial, ear and "tooth" pain x ~ 1 week; denies fever  Taking I buprofen    Earache     Bilat: R slightly > L  History of Present Illness       54-year-old female presents the clinic with facial, ear, to pain that started 1 week ago  Patient states that she noticed pain to bilateral cheeks with right-side worse than left  Patient denies fevers, chills, nausea, vomiting, abdominal pain, dizziness, lightheadedness  Patient states she has had some fatigue, congestion, bilateral ear pain, rhinorrhea, sinus pressure and pain, scratchy throat, headaches  Patient states that she has taken ibuprofen without improvement in symptoms  Patient notes that her symptoms have worsened over last couple days  Review of Systems   Review of Systems   Constitutional: Positive for fatigue  Negative for chills and fever  HENT: Positive for congestion, ear pain (both ears), rhinorrhea (mild), sinus pressure, sinus pain and sore throat (scratchy)  Respiratory: Negative for cough, chest tightness, shortness of breath and wheezing  Gastrointestinal: Negative for abdominal pain, diarrhea, nausea and vomiting  Musculoskeletal: Negative for myalgias  Skin: Negative for rash  Neurological: Positive for headaches  Negative for dizziness and light-headedness           Current Medications       Current Outpatient Medications:     amoxicillin-clavulanate (AUGMENTIN) 875-125 mg per tablet, Take 1 tablet by mouth every 12 (twelve) hours for 7 days, Disp: 14 tablet, Rfl: 0    Current Facility-Administered Medications:     cyanocobalamin injection 1,000 mcg, 1,000 mcg, Intramuscular, Weekly, Marline Garcia DO, 1,000 mcg at 09/27/19 1531    Current Allergies     Allergies as of 08/25/2020    (No Known Allergies)            The following portions of the patient's history were reviewed and updated as appropriate: allergies, current medications, past family history, past medical history, past social history, past surgical history and problem list      Past Medical History:   Diagnosis Date    Anxiety     Sacroiliitis ([address] )     Resolved 6/30/2015        Past Surgical History:   Procedure Laterality Date    TUBAL LIGATION         Family History   Problem Relation Age of Onset    Hypertension Mother     Obesity Mother     Diabetes Father     Hypertension Father     Obesity Father          Medications have been verified  Objective   /68   Pulse 80   Temp 98 8 °F (37 1 °C)   Resp 18   Ht 5' 4" (1 626 m)   Wt 77 1 kg (170 lb)   SpO2 100%   BMI 29 18 kg/m²        Physical Exam     Physical Exam  Vitals signs and nursing note reviewed  Constitutional:       General: She is not in acute distress  Appearance: She is well-developed  She is not diaphoretic  HENT:      Head: Normocephalic and atraumatic  Right Ear: A middle ear effusion is present  Tympanic membrane is bulging  Tympanic membrane is not erythematous  Left Ear: A middle ear effusion is present  Tympanic membrane is bulging  Tympanic membrane is not erythematous  Nose: Mucosal edema and congestion present  No rhinorrhea  Right Sinus: Maxillary sinus tenderness present  Left Sinus: Maxillary sinus tenderness present  Mouth/Throat:      Pharynx: Uvula midline  Posterior oropharyngeal erythema (PND) present  Cardiovascular:      Rate and Rhythm: Normal rate and regular rhythm  Pulmonary:      Effort: Pulmonary effort is normal       Breath sounds: Normal breath sounds  Musculoskeletal: Normal range of motion  Skin:     General: Skin is warm and dry  Findings: No rash  Neurological:      Mental Status: She is alert and oriented to person, place, and time

## 2021-01-11 ENCOUNTER — IMMUNIZATIONS (OUTPATIENT)
Dept: FAMILY MEDICINE CLINIC | Facility: HOSPITAL | Age: 41
End: 2021-01-11

## 2021-01-11 DIAGNOSIS — Z23 ENCOUNTER FOR IMMUNIZATION: ICD-10-CM

## 2021-01-11 PROCEDURE — 91301 SARS-COV-2 / COVID-19 MRNA VACCINE (MODERNA) 100 MCG: CPT

## 2021-01-11 PROCEDURE — 0011A SARS-COV-2 / COVID-19 MRNA VACCINE (MODERNA) 100 MCG: CPT

## 2021-02-08 ENCOUNTER — IMMUNIZATIONS (OUTPATIENT)
Dept: FAMILY MEDICINE CLINIC | Facility: HOSPITAL | Age: 41
End: 2021-02-08

## 2021-02-08 DIAGNOSIS — Z23 ENCOUNTER FOR IMMUNIZATION: Primary | ICD-10-CM

## 2021-02-08 PROCEDURE — 91301 SARS-COV-2 / COVID-19 MRNA VACCINE (MODERNA) 100 MCG: CPT

## 2021-02-08 PROCEDURE — 0012A SARS-COV-2 / COVID-19 MRNA VACCINE (MODERNA) 100 MCG: CPT

## 2021-05-26 ENCOUNTER — RA CDI HCC (OUTPATIENT)
Dept: OTHER | Facility: HOSPITAL | Age: 41
End: 2021-05-26

## 2021-05-26 NOTE — PROGRESS NOTES
[facility]  coding opportunities          Chart reviewed, no opportunity found: CHART REVIEWED, NO OPPORTUNITY FOUND              Patients insurance company: [employer] (Medicare Advantage and Commercial)

## 2021-06-02 ENCOUNTER — OFFICE VISIT (OUTPATIENT)
Dept: FAMILY MEDICINE CLINIC | Facility: HOSPITAL | Age: 41
End: 2021-06-02
Payer: COMMERCIAL

## 2021-06-02 VITALS
HEART RATE: 76 BPM | WEIGHT: 192 LBS | TEMPERATURE: 98.3 F | BODY MASS INDEX: 32.78 KG/M2 | HEIGHT: 64 IN | SYSTOLIC BLOOD PRESSURE: 140 MMHG | DIASTOLIC BLOOD PRESSURE: 72 MMHG

## 2021-06-02 DIAGNOSIS — E53.8 LOW SERUM VITAMIN B12: ICD-10-CM

## 2021-06-02 DIAGNOSIS — Z00.00 ANNUAL PHYSICAL EXAM: Primary | ICD-10-CM

## 2021-06-02 DIAGNOSIS — Z11.1 ENCOUNTER FOR PPD TEST: ICD-10-CM

## 2021-06-02 DIAGNOSIS — Z13.6 SCREENING FOR CARDIOVASCULAR CONDITION: ICD-10-CM

## 2021-06-02 DIAGNOSIS — E61.1 LOW IRON: ICD-10-CM

## 2021-06-02 PROCEDURE — 99396 PREV VISIT EST AGE 40-64: CPT | Performed by: FAMILY MEDICINE

## 2021-06-02 PROCEDURE — 1036F TOBACCO NON-USER: CPT | Performed by: FAMILY MEDICINE

## 2021-06-02 PROCEDURE — 86580 TB INTRADERMAL TEST: CPT

## 2021-06-02 PROCEDURE — 3725F SCREEN DEPRESSION PERFORMED: CPT | Performed by: FAMILY MEDICINE

## 2021-06-02 PROCEDURE — 3008F BODY MASS INDEX DOCD: CPT | Performed by: FAMILY MEDICINE

## 2021-06-02 NOTE — PROGRESS NOTES
Tavia Flores MD    NAME: Petrona Ortiz  AGE: 39 y o  SEX: female  : 1980     DATE: 2021     Assessment and Plan:     Problem List Items Addressed This Visit     None      Visit Diagnoses     Annual physical exam    -  Primary    Encounter for PPD test        Relevant Orders    TB Skin Test (Completed)    Low serum vitamin B12        Relevant Orders    Vitamin B12    Low iron        Screening for cardiovascular condition        Relevant Orders    Comprehensive metabolic panel    Lipid Panel with Direct LDL reflex      Anxiety  Controlling with nonpharmacologic methods  Sees gyn for cervical cancer screening  utd with mammogram      Forms for work completed  Has had low b12 levels  Recheck labs  Consider IM treatment if ocntinues to be low  Immunizations and preventive care screenings were discussed with patient today  Appropriate education was printed on patient's after visit summary  Counseling:  Alcohol/drug use: discussed moderation in alcohol intake, the recommendations for healthy alcohol use, and avoidance of illicit drug use  Dental Health: discussed importance of regular tooth brushing, flossing, and dental visits  Sexual health: discussed sexually transmitted diseases, partner selection, use of condoms, avoidance of unintended pregnancy, and contraceptive alternatives  · Exercise: the importance of regular exercise/physical activity was discussed  Recommend exercise 3-5 times per week for at least 30 minutes  BMI Counseling: Body mass index is 32 96 kg/m²  The BMI is above normal  Nutrition recommendations include decreasing portion sizes, encouraging healthy choices of fruits and vegetables, decreasing fast food intake, consuming healthier snacks, moderation in carbohydrate intake, increasing intake of lean protein, reducing intake of saturated and trans fat and reducing intake of cholesterol  Exercise recommendations include moderate physical activity 150 minutes/week  No follow-ups on file  Chief Complaint:     Chief Complaint   Patient presents with    Annual Exam      History of Present Illness:     Adult Annual Physical   Patient here for a comprehensive physical exam  The patient reports no problems  Diet and Physical Activity  · Diet/Nutrition: well balanced diet  · Exercise: moderate cardiovascular exercise  Depression Screening  PHQ-9 Depression Screening    PHQ-9:   Frequency of the following problems over the past two weeks:      Little interest or pleasure in doing things: 0 - not at all  Feeling down, depressed, or hopeless: 0 - not at all  PHQ-2 Score: 0       General Health  · Sleep: sleeps well  · Hearing: normal - bilateral   · Vision: no vision problems  · Dental: regular dental visits  /GYN Health  · Patient is: premenopausal  · Last menstrual period:   · Contraceptive method:btl     Review of Systems:     Review of Systems   Constitutional: Negative  Negative for activity change, appetite change, chills, diaphoresis, fatigue and fever  HENT: Negative for congestion, facial swelling and sore throat  Respiratory: Negative  Negative for apnea, cough, chest tightness and shortness of breath  Cardiovascular: Negative  Negative for chest pain and palpitations  Gastrointestinal: Negative  Negative for abdominal distention, abdominal pain, blood in stool, constipation, diarrhea and nausea  Genitourinary: Negative  Negative for difficulty urinating, dysuria, flank pain and frequency        Past Medical History:     Past Medical History:   Diagnosis Date    Anxiety     [address] [address] )     Resolved 6/30/2015       Past Surgical History:     Past Surgical History:   Procedure Laterality Date    TUBAL LIGATION        Social History:        Social History     Socioeconomic History    Marital status: /Civil Union     Spouse name: None  Number of children: None    Years of education: None    Highest education level: None   Occupational History    None   Social Needs    Financial resource strain: None    Food insecurity     Worry: None     Inability: None    Transportation needs     Medical: None     Non-medical: None   Tobacco Use    Smoking status: Former Smoker     Quit date: 2019     Years since quittin 8    Smokeless tobacco: Never Used   Substance and Sexual Activity    Alcohol use: No    Drug use: No    Sexual activity: None   Lifestyle    Physical activity     Days per week: None     Minutes per session: None    Stress: None   Relationships    Social connections     Talks on phone: None     Gets together: None     Attends Synagogue service: None     Active member of club or organization: None     Attends meetings of clubs or organizations: None     Relationship status: None    Intimate partner violence     Fear of current or ex partner: None     Emotionally abused: None     Physically abused: None     Forced sexual activity: None   Other Topics Concern    None   Social History Narrative    History of current some day smoker, resolved 2017 - As per [employer]     Current some day smoker - As per [employer]    Former smoker - As per [employer]      Family History:     Family History   Problem Relation Age of Onset    Hypertension Mother     Obesity Mother     Diabetes Father     Hypertension Father     Obesity Father       Current Medications:     No current outpatient medications on file       Current Facility-Administered Medications   Medication Dose Route Frequency Provider Last Rate Last Admin    cyanocobalamin injection 1,000 mcg  1,000 mcg Intramuscular Weekly Luci Jernigan DO   1,000 mcg at 19 1531      Allergies:     No Known Allergies   Physical Exam:     /72   Pulse 76   Temp 98 3 °F (36 8 °C)   Ht 5' 4" (1 626 m)   Wt 87 1 kg (192 lb)   BMI 32 96 kg/m²     Physical Exam  Vitals signs and nursing note reviewed  Constitutional:       Appearance: Normal appearance  She is well-developed  She is obese  She is not ill-appearing or diaphoretic  HENT:      Head: Normocephalic and atraumatic  Right Ear: Tympanic membrane, ear canal and external ear normal       Left Ear: Tympanic membrane, ear canal and external ear normal       Nose: Nose normal       Mouth/Throat:      Mouth: Mucous membranes are moist    Eyes:      Conjunctiva/sclera: Conjunctivae normal       Pupils: Pupils are equal, round, and reactive to light  Neck:      Musculoskeletal: Normal range of motion and neck supple  Cardiovascular:      Rate and Rhythm: Normal rate and regular rhythm  Heart sounds: Normal heart sounds  Pulmonary:      Effort: Pulmonary effort is normal       Breath sounds: Normal breath sounds  Abdominal:      General: Bowel sounds are normal       Palpations: Abdomen is soft  Skin:     General: Skin is warm and dry  Capillary Refill: Capillary refill takes less than 2 seconds  Neurological:      General: No focal deficit present  Mental Status: She is alert and oriented to person, place, and time     Psychiatric:         Mood and Affect: Mood normal          Behavior: Behavior normal           MD Deion Araiza MD

## 2021-06-02 NOTE — PATIENT INSTRUCTIONS

## 2021-06-03 ENCOUNTER — TELEPHONE (OUTPATIENT)
Dept: ADMINISTRATIVE | Facility: OTHER | Age: 41
End: 2021-06-03

## 2021-06-03 NOTE — TELEPHONE ENCOUNTER
Upon review of the In Basket request we were able to locate, review, and update the patient chart as requested for Pap Smear (HPV) aka Cervical Cancer Screening  Any additional questions or concerns should be emailed to the Practice Liaisons via [email or web address] email, please do not reply via In Basket      Thank you  Wan Deluna MA

## 2021-06-03 NOTE — TELEPHONE ENCOUNTER
----- Message from Zulma sent at 6/2/2021  6:01 PM EDT -----  Regarding: Pap - Kaiden Estevez [facility]  06/02/21 6:02 PM    Hello, our patient Petrona Ortiz has had Pap Smear (HPV) aka Cervical Cancer Screening completed/performed  Please assist in updating the patient chart by pulling the Care Everywhere (CE) document  The date of service is 03/11/2020       Thank you,  Rogelio Carrasco MD

## 2025-01-30 ENCOUNTER — HOSPITAL ENCOUNTER (OUTPATIENT)
Dept: CT IMAGING | Facility: HOSPITAL | Age: 45
Discharge: HOME | End: 2025-01-30
Attending: INTERNAL MEDICINE
Payer: COMMERCIAL

## 2025-01-30 DIAGNOSIS — R51.9 HEADACHE, UNSPECIFIED: ICD-10-CM

## 2025-01-30 PROCEDURE — 70470 CT HEAD/BRAIN W/O & W/DYE: CPT

## 2025-01-30 RX ADMIN — IOHEXOL 85 ML: 350 INJECTION, SOLUTION INTRAVENOUS at 18:10

## 2025-02-03 ENCOUNTER — TELEPHONE (OUTPATIENT)
Dept: FAMILY MEDICINE CLINIC | Facility: HOSPITAL | Age: 45
End: 2025-02-03

## 2025-02-03 NOTE — TELEPHONE ENCOUNTER
Per care everywhere, patient has established with Dr Bea Sargent.    Please remove Connie Lees as pcp.

## 2025-02-26 NOTE — TELEPHONE ENCOUNTER
02/25/25 8:59 PM    The office's request has been received and reviewed. At this time we are unable to remove PCP. The provider is identified as their PCP via RTE and /or  on the insurance card.      The patient must contact their insurance company and update the PCP with them.     Abhay Magaña

## 2025-03-03 ENCOUNTER — TELEPHONE (OUTPATIENT)
Dept: FAMILY MEDICINE CLINIC | Facility: HOSPITAL | Age: 45
End: 2025-03-03

## 2025-03-03 NOTE — TELEPHONE ENCOUNTER
Left message.    Patient's insurance has Connie Lees listed as pcp via the RTE.  Patient no longer sees Connie Lees.    Asked patient to pls call insurance to change her pcp w/ them.